# Patient Record
Sex: FEMALE | Race: WHITE
[De-identification: names, ages, dates, MRNs, and addresses within clinical notes are randomized per-mention and may not be internally consistent; named-entity substitution may affect disease eponyms.]

---

## 2017-06-07 ENCOUNTER — HOSPITAL ENCOUNTER (EMERGENCY)
Dept: HOSPITAL 63 - ER | Age: 47
Discharge: HOME | End: 2017-06-07
Payer: OTHER GOVERNMENT

## 2017-06-07 VITALS — HEIGHT: 66 IN | BODY MASS INDEX: 33.75 KG/M2 | WEIGHT: 210 LBS

## 2017-06-07 VITALS — DIASTOLIC BLOOD PRESSURE: 89 MMHG | SYSTOLIC BLOOD PRESSURE: 131 MMHG

## 2017-06-07 DIAGNOSIS — R10.31: Primary | ICD-10-CM

## 2017-06-07 DIAGNOSIS — R11.2: ICD-10-CM

## 2017-06-07 LAB
ALBUMIN SERPL-MCNC: 3.7 G/DL (ref 3.4–5)
ALBUMIN/GLOB SERPL: 0.9 {RATIO} (ref 1–1.7)
ALP SERPL-CCNC: 77 U/L (ref 46–116)
ALT SERPL-CCNC: 34 U/L (ref 14–59)
ANION GAP SERPL CALC-SCNC: 7 MMOL/L (ref 6–14)
APTT PPP: YELLOW S
AST SERPL-CCNC: 18 U/L (ref 15–37)
BACTERIA #/AREA URNS HPF: 0 /HPF
BASOPHILS # BLD AUTO: 0.1 X10^3/UL (ref 0–0.2)
BASOPHILS NFR BLD: 1 % (ref 0–3)
BILIRUB SERPL-MCNC: 0.3 MG/DL (ref 0.2–1)
BILIRUB UR QL STRIP: (no result)
BUN/CREAT SERPL: 15 (ref 6–20)
CA-I SERPL ISE-MCNC: 15 MG/DL (ref 7–20)
CALCIUM SERPL-MCNC: 8.8 MG/DL (ref 8.5–10.1)
CHLORIDE SERPL-SCNC: 105 MMOL/L (ref 98–107)
CO2 SERPL-SCNC: 29 MMOL/L (ref 21–32)
CREAT SERPL-MCNC: 1 MG/DL (ref 0.6–1)
EOSINOPHIL NFR BLD: 0.1 X10^3/UL (ref 0–0.7)
EOSINOPHIL NFR BLD: 1 % (ref 0–3)
ERYTHROCYTE [DISTWIDTH] IN BLOOD BY AUTOMATED COUNT: 13.5 % (ref 11.5–14.5)
FIBRINOGEN PPP-MCNC: CLEAR MG/DL
GFR SERPLBLD BASED ON 1.73 SQ M-ARVRAT: 60 ML/MIN
GLOBULIN SER-MCNC: 3.9 G/DL (ref 2.2–3.8)
GLUCOSE SERPL-MCNC: 101 MG/DL (ref 70–99)
GLUCOSE UR STRIP-MCNC: (no result) MG/DL
HCT VFR BLD CALC: 41.6 % (ref 36–47)
HGB BLD-MCNC: 14.5 G/DL (ref 12–15.5)
LIPASE: 86 U/L (ref 73–393)
LYMPHOCYTES # BLD: 2.4 X10^3/UL (ref 1–4.8)
LYMPHOCYTES NFR BLD AUTO: 29 % (ref 24–48)
MCH RBC QN AUTO: 31 PG (ref 25–35)
MCHC RBC AUTO-ENTMCNC: 35 G/DL (ref 31–37)
MCV RBC AUTO: 88 FL (ref 79–100)
MONO #: 0.7 X10^3/UL (ref 0–1.1)
MONOCYTES NFR BLD: 9 % (ref 0–9)
NEUT #: 4.8 X10^3UL (ref 1.8–7.7)
NEUTROPHILS NFR BLD AUTO: 60 % (ref 31–73)
NITRITE UR QL STRIP: (no result)
PLATELET # BLD AUTO: 248 X10^3/UL (ref 140–400)
POTASSIUM SERPL-SCNC: 4 MMOL/L (ref 3.5–5.1)
PROT SERPL-MCNC: 7.6 G/DL (ref 6.4–8.2)
RBC # BLD AUTO: 4.74 X10^6/UL (ref 3.5–5.4)
RBC #/AREA URNS HPF: (no result) /HPF (ref 0–2)
SODIUM SERPL-SCNC: 141 MMOL/L (ref 136–145)
SP GR UR STRIP: 1.01
SQUAMOUS #/AREA URNS LPF: (no result) /LPF
UROBILINOGEN UR-MCNC: 0.2 MG/DL
WBC # BLD AUTO: 8 X10^3/UL (ref 4–11)
WBC #/AREA URNS HPF: (no result) /HPF (ref 0–4)

## 2017-06-07 PROCEDURE — 80053 COMPREHEN METABOLIC PANEL: CPT

## 2017-06-07 PROCEDURE — 36415 COLL VENOUS BLD VENIPUNCTURE: CPT

## 2017-06-07 PROCEDURE — 81001 URINALYSIS AUTO W/SCOPE: CPT

## 2017-06-07 PROCEDURE — 85027 COMPLETE CBC AUTOMATED: CPT

## 2017-06-07 PROCEDURE — 96374 THER/PROPH/DIAG INJ IV PUSH: CPT

## 2017-06-07 PROCEDURE — 74177 CT ABD & PELVIS W/CONTRAST: CPT

## 2017-06-07 PROCEDURE — 96361 HYDRATE IV INFUSION ADD-ON: CPT

## 2017-06-07 PROCEDURE — 81025 URINE PREGNANCY TEST: CPT

## 2017-06-07 PROCEDURE — 99285 EMERGENCY DEPT VISIT HI MDM: CPT

## 2017-06-07 PROCEDURE — 96375 TX/PRO/DX INJ NEW DRUG ADDON: CPT

## 2017-06-07 PROCEDURE — 83690 ASSAY OF LIPASE: CPT

## 2017-06-07 NOTE — RAD
Exam performed: CT scan of the abdomen and pelvis with contrast

 

Clinical Indication: Right lower quadrant pain radiating back and 

inferiorly

 

Date of Service: 6/7/2017 no priors

 

Technique: Contiguous helical acquisitions are obtained  from the lung 

bases to the pelvis during intravenous administration of [75 cc of 

Omnipaque 300]. Sagittal and coronal reformatted images were obtained and 

reviewed. In addition Contiguous helical acquisitions of  the  lumbar 

spine are  reconstructed through the CT Abdomen and pelvis. Sagittal and 

coronal reformatted images  are obtained and reviewed.

 

CT abdomen findings:

 

The lung bases appear essentially clear. Visualized heart is normal.

 

The liver, spleen ,gall bladder and pancreas appears unremarkable. Both 

adrenal glands and bilateral kidneys appear normal with symmetric 

excretion of contrast via both kidneys. There is a 10.4 mm nonobstructing 

calculus in the right inferior renal pole. Aorta is normal in caliber 

without aneurysm.  The small bowel loops appear nondilated and 

unremarkable.  There is no retroperitoneal lymphadenopathy or mass 

lesions.  No bowel related inflammatory stranding is noted. Visualized 

appendix is normal. No obvious stranding is seen in the pericecal region. 

 

CT pelvis findings:

 

The pelvic bowel loops are nondilated and unremarkable.  The urinary 

bladder is well distended and normal .  Uterus is anteverted. No adnexal 

masses seen.

 

Interrogation of bone windows demonstrates no obvious bony abnormality. 

 

Sagittal and coronal  reformatted images were obtained and reviewed which 

demonstrate no additional findings. 

 

Impression abdomen and pelvis :

 

1. A 10.4 mm nonobstructing calculus in the right inferior renal pole.

2. No acute intra-abdominal or pelvic process is detected.  

 

End impression

 

CT lumbar spine findings:

 

Normal sagittal alignment is preserved. Five nonrib-bearing vertebral 

bodies are identified. The vertebral body heights and intervertebral disc 

spaces are maintained. There is no acute compression fracture. No 

prevertebral soft tissue swelling or mass is detected. The aorta is 

normal.

 

Impression:

1. No acute abnormality seen in the CT lumbar spine.

 

 

PQRS Compliance Statement:

 

One or more of the following individualized dose reduction techniques were

utilized for this examination:

1. Automated exposure control

2. Adjustment of the mA and/or kV according to patient size

3. Use of iterative reconstruction technique

 

Electronically signed by: Gudelia Patel MD (6/7/2017 6:12 PM)

## 2017-06-07 NOTE — PHYS DOC
Past History


Past Medical History:  No Pertinent History


Alcohol Use:  None


Drug Use:  None





Adult General


Chief Complaint


Chief Complaint:  BACK PAIN OR INJURY





HPI


HPI





This 46-year-old lady presents with pain in the right lower quadrant of her 

abdomen as well as pain in her back and the pain goes down the anterior aspect 

of her right leg.


The symptoms all been going on since chest facies had a little nausea vomiting 

has had no fever chills no diarrhea


She denies dysuria frequency urgency.


She is due to have a hysterectomy and a couple months.


She denies vaginal discharge or vaginal bleeding


She does note that she's had some swelling in her ankles yesterday





Review of Systems


Review of Systems





Constitutional: Denies fever or chills []


Eyes: Denies change in visual acuity, redness, or eye pain []


HENT: Denies nasal congestion or sore throat []


Respiratory: Denies cough or shortness of breath []


Cardiovascular: No additional information not addressed in HPI []


GI: Had right lower quadrant abdominal pain with, vomiting, ]


: Denies dysuria or hematuria []


Musculoskeletal: He does have some pain in her low back


Integument: Denies rash or skin lesions []


Neurologic: Denies headache, focal weakness or sensory changes []


Endocrine: Denies polyuria or polydipsia []





Current Medications


Current Medications





Current Medications








 Medications


  (Trade)  Dose


 Ordered  Sig/Annie  Start Time


 Stop Time Status Last Admin


Dose Admin


 


 Morphine Sulfate


  (Morphine 4mg


 Syringe)  4 mg  PRN Q15MIN  PRN  17 16:45


 17 16:44 UNV  


 


 


 Ondansetron HCl


  (Zofran)  4 mg  1X  ONCE  17 16:45


 17 16:46 UNV  


 


 


 Sodium Chloride  1,000 ml @ 


 1,000 mls/hr  Q1H  17 16:33


 17 17:32 UNV  


 











Physical Exam


Physical Exam





Constitutional: Well developed, well nourished, no acute distress, non-toxic 

appearance. []


HENT: Normocephalic, atraumatic, bilateral external ears normal, oropharynx 

moist, no oral exudates, nose normal. []


Eyes: PERRLA, EOMI, conjunctiva normal, no discharge. [] 


Neck: Normal range of motion, no tenderness, supple, no stridor. [] 


Cardiovascular:Heart rate regular rhythm, no murmur []


Lungs & Thorax:  Bilateral breath sounds clear to auscultation []


Abdomen: Bowel sounds normal, soft, patient does have tenderness in her right 

lower quadrant without rebound or guarding 


Skin: Warm, dry, no erythema, no rash. [] 


Back: No tenderness, no CVA tenderness. Straight leg raising is negative. DTRs 

are 2+ bilaterally motor and sensory examination are unremarkable


Extremities: No tenderness, no cyanosis, no clubbing, ROM intact, no edema. [] 


Neurologic: Alert and oriented X 3, normal motor function, normal sensory 

function, no focal deficits noted. []


Psychologic: Affect normal, judgement normal, mood normal. []





Current Patient Data


Vital Signs





 Vital Signs








  Date Time  Temp Pulse Resp B/P (MAP) Pulse Ox O2 Delivery O2 Flow Rate FiO2


 


17 15:58  89 16  99 Room Air  








Lab Results


Nursery Laboratory Tests


17 16:54: 


White Blood Count 8.0, Red Blood Count 4.74, Hemoglobin 14.5, Hematocrit 41.6, 

Mean Corpuscular Volume 88, Mean Corpuscular Hemoglobin 31, Mean Corpuscular 

Hemoglobin Concent 35, Red Cell Distribution Width 13.5, Platelet Count 248, 

Neutrophils (%) (Auto) 60, Lymphocytes (%) (Auto) 29, Monocytes (%) (Auto) 9, 

Eosinophils (%) (Auto) 1, Basophils (%) (Auto) 1, Neutrophils # (Auto) 4.8, 

Lymphocytes # (Auto) 2.4, Monocytes # (Auto) 0.7, Eosinophils # (Auto) 0.1, 

Basophils # (Auto) 0.1, Sodium Level 141, Potassium Level 4.0, Chloride Level 

105, Carbon Dioxide Level 29, Anion Gap 7, Blood Urea Nitrogen 15, Creatinine 

1.0, Estimated GFR (Cockcroft-Gault) 60.0, BUN/Creatinine Ratio 15, Glucose 

Level 101, Calcium Level 8.8, Total Bilirubin 0.3, Aspartate Amino Transf (AST/

SGOT) 18, Alanine Aminotransferase (ALT/SGPT) 34, Alkaline Phosphatase 77, 

Total Protein 7.6, Albumin 3.7, Albumin/Globulin Ratio 0.9, Lipase 86





EKG


EKG


[]





Radiology/Procedures


Radiology/Procedures


[] [address] [place] [id number] [phone]


 


 IMAGING REPORT





 Signed





PATIENT: LAURA RODRIGUEZ ACCOUNT: [id number] MRN#: [id number]


: 1970 LOCATION: ER AGE: 46


SEX: F EXAM DT: 17 ACCESSION#: [id number]


STATUS: REG ER ORD. PHYSICIAN: FARZAD NOLAN MD 


REASON: RLQ pain


PROCEDURE: CT ABD PELV W/ IV CONTRST ONLY





Exam performed: CT scan of the abdomen and pelvis with contrast


 


Clinical Indication: Right lower quadrant pain radiating back and 


inferiorly


 


Date of Service: 2017 no priors


 


Technique: Contiguous helical acquisitions are obtained  from the lung 


bases to the pelvis during intravenous administration of [75 cc of 


Omnipaque 300]. Sagittal and coronal reformatted images were obtained and 


reviewed. In addition Contiguous helical acquisitions of  the  lumbar 


spine are  reconstructed through the CT Abdomen and pelvis. Sagittal and 


coronal reformatted images  are obtained and reviewed.


 


CT abdomen findings:


 


The lung bases appear essentially clear. Visualized heart is normal.


 


The liver, spleen ,gall bladder and pancreas appears unremarkable. Both 


adrenal glands and bilateral kidneys appear normal with symmetric 


excretion of contrast via both kidneys. There is a 10.4 mm nonobstructing 


calculus in the right inferior renal pole. Aorta is normal in caliber 


without aneurysm.  The small bowel loops appear nondilated and 


unremarkable.  There is no retroperitoneal lymphadenopathy or mass 


lesions.  No bowel related inflammatory stranding is noted. Visualized 


appendix is normal. No obvious stranding is seen in the pericecal region. 


 


CT pelvis findings:


 


The pelvic bowel loops are nondilated and unremarkable.  The urinary 


bladder is well distended and normal .  Uterus is anteverted. No adnexal 


masses seen.


 


Interrogation of bone windows demonstrates no obvious bony abnormality. 


 


Sagittal and coronal  reformatted images were obtained and reviewed which 


demonstrate no additional findings. 


 


Impression abdomen and pelvis :


 


1. A 10.4 mm nonobstructing calculus in the right inferior renal pole.


2. No acute intra-abdominal or pelvic process is detected.  


 


End impression


 


CT lumbar spine findings:


 


Normal sagittal alignment is preserved. Five nonrib-bearing vertebral 


bodies are identified. The vertebral body heights and intervertebral disc 


spaces are maintained. There is no acute compression fracture. No 


prevertebral soft tissue swelling or mass is detected. The aorta is 


normal.


 


Impression:


1. No acute abnormality seen in the CT lumbar spine.


 


 


PQRS Compliance Statement:


 


One or more of the following individualized dose reduction techniques were


utilized for this examination:


1. Automated exposure control


2. Adjustment of the mA and/or kV according to patient size


3. Use of iterative reconstruction technique


 


Electronically signed by: Gudelia Patel MD (2017 6:12 PM)














DICTATED AND SIGNED BY:     GUDELIA PATEL MD


DATE:     17 1644





CC: FARZAD NOLAN MD; BERNIE OLSON MD ~





Course & Med Decision Making


Course & Med Decision Making


Pertinent Labs and Imaging studies reviewed. (See chart for details)





Patient turned over to me pending CT abdomen pelvis and lumbar spine looking 

for signs of appendicitis. Patient reevaluated patient is improved with IV 

medications provided by Dr. Waite. Reviewed laboratory work nurse's notes and 

vital signs which are unremarkable this time. Still pending a urinalysis. Urine 

pregnancy test is still pending. Times 6:51 PM


[] Urinalysis negative





Urine pregnancy test is negative. Impression abdominal pain of unclear etiology 

patient given supportive medications and asked to follow-up with her primary 

care doctor next 12-24 hours.





Dragon Disclaimer


Dragon Disclaimer


This chart was dictated in whole or in part using Voice Recognition software in 

a busy, high-work load, and often noisy Emergency Department environment.  It 

may contain unintended and wholly unrecognized errors or omissions.





Departure


Departure:


Impression:  


 Primary Impression:  


 Abdominal pain


Disposition:  01 HOME, SELF-CARE


Condition:  IMPROVED


Referrals:  


BERNIE OLSON MD (PCP)


Patient Instructions:  Abdominal Pain





Additional Instructions:  


Please return for any new or increasing symptoms, activity question concerns, 

or have any blood in urine fever greater than 102.2 or if you have any new back 

pain difficult urinating or questions or concerns.


Scripts


Ondansetron (ZOFRAN ODT) 8 Mg Tab.rapdis


4 MG PO TID for 7 Days


   Prov: APOLONIA CISSE MD         17 


Hydrocodone Bit/Acetaminophen (HYDROCODONE-APAP 5-325  **) 1 Each Tablet


1 TAB PO PRN Q6HRS Y for PAIN for 7 Days, #10 TAB 0 Refills


   Prov: APOLONIA CISSE MD         17











FARZAD NOLAN MD 2017 16:42


APOLONIA CISSE MD 2017 18:53

## 2018-08-18 ENCOUNTER — HOSPITAL ENCOUNTER (EMERGENCY)
Dept: HOSPITAL 63 - ER | Age: 48
Discharge: HOME | End: 2018-08-18
Payer: OTHER GOVERNMENT

## 2018-08-18 VITALS — HEIGHT: 66 IN | WEIGHT: 210 LBS | BODY MASS INDEX: 33.75 KG/M2

## 2018-08-18 VITALS — SYSTOLIC BLOOD PRESSURE: 132 MMHG | DIASTOLIC BLOOD PRESSURE: 82 MMHG

## 2018-08-18 DIAGNOSIS — J45.909: Primary | ICD-10-CM

## 2018-08-18 LAB
ANION GAP SERPL CALC-SCNC: 11 MMOL/L (ref 6–14)
APTT PPP: YELLOW S
BACTERIA #/AREA URNS HPF: (no result) /HPF
BASOPHILS # BLD AUTO: 0.1 X10^3/UL (ref 0–0.2)
BASOPHILS NFR BLD: 2 % (ref 0–3)
BILIRUB UR QL STRIP: (no result)
CA-I SERPL ISE-MCNC: 13 MG/DL (ref 7–20)
CALCIUM SERPL-MCNC: 8.6 MG/DL (ref 8.5–10.1)
CHLORIDE SERPL-SCNC: 107 MMOL/L (ref 98–107)
CO2 SERPL-SCNC: 25 MMOL/L (ref 21–32)
CREAT SERPL-MCNC: 0.9 MG/DL (ref 0.6–1)
EOSINOPHIL NFR BLD: 0.4 X10^3/UL (ref 0–0.7)
EOSINOPHIL NFR BLD: 4 % (ref 0–3)
ERYTHROCYTE [DISTWIDTH] IN BLOOD BY AUTOMATED COUNT: 12.8 % (ref 11.5–14.5)
FIBRINOGEN PPP-MCNC: (no result) MG/DL
GFR SERPLBLD BASED ON 1.73 SQ M-ARVRAT: 67.4 ML/MIN
GLUCOSE SERPL-MCNC: 109 MG/DL (ref 70–99)
GLUCOSE UR STRIP-MCNC: (no result) MG/DL
HCT VFR BLD CALC: 40.9 % (ref 36–47)
HGB BLD-MCNC: 14.4 G/DL (ref 12–15.5)
LYMPHOCYTES # BLD: 2.2 X10^3/UL (ref 1–4.8)
LYMPHOCYTES NFR BLD AUTO: 23 % (ref 24–48)
MCH RBC QN AUTO: 31 PG (ref 25–35)
MCHC RBC AUTO-ENTMCNC: 35 G/DL (ref 31–37)
MCV RBC AUTO: 87 FL (ref 79–100)
MONO #: 0.6 X10^3/UL (ref 0–1.1)
MONOCYTES NFR BLD: 6 % (ref 0–9)
NEUT #: 6 X10^3UL (ref 1.8–7.7)
NEUTROPHILS NFR BLD AUTO: 64 % (ref 31–73)
NITRITE UR QL STRIP: (no result)
PLATELET # BLD AUTO: 256 X10^3/UL (ref 140–400)
POTASSIUM SERPL-SCNC: 3.9 MMOL/L (ref 3.5–5.1)
RBC # BLD AUTO: 4.69 X10^6/UL (ref 3.5–5.4)
RBC #/AREA URNS HPF: (no result) /HPF (ref 0–2)
SODIUM SERPL-SCNC: 143 MMOL/L (ref 136–145)
SP GR UR STRIP: 1.02
SQUAMOUS #/AREA URNS LPF: (no result) /LPF
UROBILINOGEN UR-MCNC: 0.2 MG/DL
WBC # BLD AUTO: 9.3 X10^3/UL (ref 4–11)
WBC #/AREA URNS HPF: (no result) /HPF (ref 0–4)

## 2018-08-18 PROCEDURE — 81001 URINALYSIS AUTO W/SCOPE: CPT

## 2018-08-18 PROCEDURE — 99285 EMERGENCY DEPT VISIT HI MDM: CPT

## 2018-08-18 PROCEDURE — 36415 COLL VENOUS BLD VENIPUNCTURE: CPT

## 2018-08-18 PROCEDURE — 71046 X-RAY EXAM CHEST 2 VIEWS: CPT

## 2018-08-18 PROCEDURE — 94640 AIRWAY INHALATION TREATMENT: CPT

## 2018-08-18 PROCEDURE — 84484 ASSAY OF TROPONIN QUANT: CPT

## 2018-08-18 PROCEDURE — 87880 STREP A ASSAY W/OPTIC: CPT

## 2018-08-18 PROCEDURE — 93005 ELECTROCARDIOGRAM TRACING: CPT

## 2018-08-18 PROCEDURE — 96374 THER/PROPH/DIAG INJ IV PUSH: CPT

## 2018-08-18 PROCEDURE — 87070 CULTURE OTHR SPECIMN AEROBIC: CPT

## 2018-08-18 PROCEDURE — 80048 BASIC METABOLIC PNL TOTAL CA: CPT

## 2018-08-18 PROCEDURE — 85025 COMPLETE CBC W/AUTO DIFF WBC: CPT

## 2018-08-18 PROCEDURE — 96375 TX/PRO/DX INJ NEW DRUG ADDON: CPT

## 2018-08-18 PROCEDURE — 96361 HYDRATE IV INFUSION ADD-ON: CPT

## 2018-08-18 NOTE — EKG
[facility] [address], [place] [address]

Test Date:    2018               Test Time:    07:44:32

Pat Name:     LAURA RODRIGUEZ          Department:   

Patient ID:   [id number]           Room:          

Gender:       F                        Technician:   

:          1970               Requested By: BJ IYER

Order Number: [id number]            Reading MD:   Scott Roger MD

                                 Measurements

Intervals                              Axis          

Rate:         98                       P:            40

WV:           136                      QRS:          16

QRSD:         70                       T:            1

QT:           350                                    

QTc:          449                                    

                           Interpretive Statements

SINUS RHYTHM



Electronically Signed On 2018 10:02:38 CDT by Scott Roger MD

## 2018-08-18 NOTE — PHYS DOC
Past History


Past Medical History:  Asthma


Past Surgical History:  Hysterectomy


Alcohol Use:  Occasionally


Drug Use:  None





Adult General


Chief Complaint


Chief Complaint:  COUGH





HPI


HPI





Patient is a 47 year old female who presents with complaining of sore throat, 

cough and chest pain. Patient states that she developed a sore throat with a 

fever of 102 approximately several days ago. Patient also progressively 

developed a nonproductive cough and states that she has upper chest pain with 

coughing. Cough is nonproductive. Patient also states that she had asthma as a 

child but is not a smoker.





Patient just finished driving her daughter to and from school when she is in 

[place]. Patient comes in today complaining of an inability to sleep last 

night secondary to cough, sore throat, general malaise and low-grade fever. 

Patient denies any calf or leg pain or swelling.





Review of Systems


Review of Systems





Constitutional: Denies fever or chills []


Eyes: Denies change in visual acuity, redness, or eye pain []


HENT: Denies nasal congestion or sore throat []


Respiratory: Positive for cough negative for shortness of breath []


Cardiovascular: No additional information not addressed in HPI chest pain 

associated with coughing only


GI: Denies abdominal pain, nausea, vomiting, bloody stools or diarrhea []


: Denies dysuria or hematuria []


Musculoskeletal: Denies back pain or joint pain []


Integument: Denies rash or skin lesions []


Neurologic: Denies headache, focal weakness or sensory changes []


Endocrine: Denies polyuria or polydipsia []





All other systems were reviewed and found to be within normal limits, except as 

documented in this note.





Allergies


Allergies





Allergies








Coded Allergies Type Severity Reaction Last Updated Verified


 


  No Known Drug Allergies    6/7/17 No











Physical Exam


Physical Exam





Constitutional: Well developed, well nourished, no acute distress, non-toxic 

appearance. []


HENT: Normocephalic, atraumatic, bilateral external ears normal, oropharynx 

moist, no oral exudates, nose normal. Mild erythema to the hypopharynx without 

exudate or ulceration


Eyes: PERRLA, EOMI, conjunctiva normal, no discharge. [] 


Neck: Normal range of motion, no tenderness, supple, no stridor. [] 


Cardiovascular:Heart rate regular rhythm, no murmur []


Lungs & Thorax:  Bilateral breath sounds clear to auscultation []


Abdomen: Bowel sounds normal, soft, no tenderness, no masses, no pulsatile 

masses. [] 


Skin: Warm, dry, no erythema, no rash. [] 


Back: No tenderness, no CVA tenderness. [] 


Extremities: No tenderness, no cyanosis, no clubbing, ROM intact, no edema. [] 


Neurologic: Alert and oriented X 3, normal motor function, normal sensory 

function, no focal deficits noted. []


Psychologic: Affect normal, judgement normal, mood normal. []





Current Patient Data


Vital Signs





 Vital Signs








  Date Time  Temp Pulse Resp B/P (MAP) Pulse Ox O2 Delivery O2 Flow Rate FiO2


 


8/18/18 07:37      Room Air  


 


8/18/18 07:37 97.7 88 20  95   











EKG


EKG


NSR @98[]





Radiology/Procedures


Radiology/Procedures


[]





Course & Med Decision Making


Course & Med Decision Making


Pertinent Labs and Imaging studies reviewed. (See chart for details)





Patient reassessed and states that she feels, after the breathing treatment, 

that she can feel like she can breathe easier and deeper. Patient is coughing 

intermittently during her stay here, nonproductive but looks like she is in 

less distress. Given this improvement with a single treatment I will add Solu-

Medrol and give her another treatment patient is being hydrated.





Dragon Disclaimer


Dragon Disclaimer


This electronic medical record was generated, in whole or in part, using a 

voice recognition dictation system.





Departure


Departure:


Impression:  


 Primary Impression:  


 Asthmatic bronchitis


Disposition:  01 HOME, SELF-CARE


Condition:  STABLE


Referrals:  


BERNIE OLSON MD (PCP)


Patient Instructions:  Acute Bronchitis, Easy-to-Read, Asthma, Acute 

Bronchospasm


Scripts


Prednisone (PREDNISONE) 50 Mg Tablet


1 TAB PO DAILY, #3 TAB


   Prov: BJ IYER MD         8/18/18 


Albuterol Sulfate (PROAIR HFA INHALER) 8.5 Gm Hfa.aer.ad


2 PUFF INH PRN Q6HRS PRN for SHORTNESS OF BREATH, #1 INHALER 1 Refill


   Prov: BJ IYER MD         8/18/18











BJ IYER MD Aug 18, 2018 08:03

## 2018-08-18 NOTE — RAD
PROCEDURE: CHEST PA   LATERAL

 

CLINICAL INDICATION: Cough x 3 days, pt shielded

 

COMPARISON: None

 

FINDINGS:  

No pneumothorax identified. Cardiac and mediastinal contours unremarkable.

No pulmonary consolidation or acute airspace disease. No acute osseous 

abnormalities identified. 

 

IMPRESSION:

No pulmonary consolidation or acute airspace disease. 

 

 

 

Electronically signed by: Devyn Zaidi DO (8/18/2018 8:32 AM) [facility]

## 2021-05-01 ENCOUNTER — HOSPITAL ENCOUNTER (EMERGENCY)
Dept: HOSPITAL 63 - ER | Age: 51
Discharge: HOME | End: 2021-05-01
Payer: OTHER GOVERNMENT

## 2021-05-01 VITALS — HEIGHT: 66 IN | BODY MASS INDEX: 27.64 KG/M2 | WEIGHT: 171.96 LBS

## 2021-05-01 VITALS — SYSTOLIC BLOOD PRESSURE: 147 MMHG | DIASTOLIC BLOOD PRESSURE: 88 MMHG

## 2021-05-01 DIAGNOSIS — Z90.710: ICD-10-CM

## 2021-05-01 DIAGNOSIS — N83.202: ICD-10-CM

## 2021-05-01 DIAGNOSIS — J45.909: ICD-10-CM

## 2021-05-01 DIAGNOSIS — N20.0: ICD-10-CM

## 2021-05-01 DIAGNOSIS — K57.90: Primary | ICD-10-CM

## 2021-05-01 LAB
APTT PPP: COLORLESS S
BACTERIA #/AREA URNS HPF: 0 /HPF
BILIRUB UR QL STRIP: (no result)
FIBRINOGEN PPP-MCNC: CLEAR MG/DL
GLUCOSE UR STRIP-MCNC: (no result) MG/DL
NITRITE UR QL STRIP: (no result)
RBC #/AREA URNS HPF: (no result) /HPF (ref 0–2)
SP GR UR STRIP: >=1.03
SQUAMOUS #/AREA URNS LPF: (no result) /LPF
UROBILINOGEN UR-MCNC: 0.2 MG/DL
WBC #/AREA URNS HPF: 0 /HPF (ref 0–4)

## 2021-05-01 PROCEDURE — 99284 EMERGENCY DEPT VISIT MOD MDM: CPT

## 2021-05-01 PROCEDURE — 74176 CT ABD & PELVIS W/O CONTRAST: CPT

## 2021-05-01 PROCEDURE — 81001 URINALYSIS AUTO W/SCOPE: CPT

## 2021-05-01 NOTE — PHYS DOC
Past History


Past Medical History:  Asthma


Past Surgical History:  Hysterectomy


Alcohol Use:  Occasionally


Drug Use:  None





Adult General


Chief Complaint


Chief Complaint:  MULTIPLE COMPLAINTS





[facility]





Patient is a female with a history of partial hysterectomy presenting today 

complaining of 3 out of 10 sharp left flank pain intermittently since this 

afternoon.  Patient denies anything specifically exacerbating the pain but 

states Tylenol r relieved some of the pain, she states the pain radiates to the 

left lower quadrant.  Denies any nausea, vomiting, denies any history of kidney 

stones.  Denies any hematuria.  Reports slight dysuria.





Review of Systems


Review of Systems





Constitutional: Denies fever or chills []


Eyes: Denies change in visual acuity, redness, or eye pain []


HENT: Denies nasal congestion or sore throat []


Respiratory: Denies cough or shortness of breath []


Cardiovascular: No additional information not addressed in hospitals []


GI: Reports left flank pain radiating to the left lower quadrant abdominal pain,

 denies nausea, vomiting, bloody stools or diarrhea []


: Denies dysuria or hematuria []


Musculoskeletal: Denies back pain or joint pain []


Integument: Denies rash or skin lesions []


Neurologic: Denies headache, focal weakness or sensory changes []








All other systems were reviewed and found to be within normal limits, except as 

documented in this note.





Allergies


Allergies





Allergies








Coded Allergies Type Severity Reaction Last Updated Verified


 


  No Known Drug Allergies    6/7/17 No











Physical Exam


Physical Exam





Constitutional: Well developed, well nourished, no acute distress, non-toxic 

appearance. []


HENT: Normocephalic, atraumatic, bilateral external ears normal, oropharynx 

moist, no oral exudates, nose normal. []


Eyes: PERRLA, EOMI, conjunctiva normal, no discharge. [] 


Neck: Normal range of motion, no tenderness, supple, no stridor. [] 


Cardiovascular:Heart rate regular rhythm, no murmur []


Lungs & Thorax:  Bilateral breath sounds clear to auscultation []


Abdomen: Bowel sounds normal, soft, no tenderness, no masses, no pulsatile 

masses. [] 


Skin: Warm, dry, no erythema, no rash. [] 


Back: No tenderness, no CVA tenderness. [] 


Extremities: No tenderness, no cyanosis, no clubbing, ROM intact, no edema. [] 


Neurologic: Alert and oriented X 3, normal motor function, normal sensory 

function, no focal deficits noted. []


Psychologic: Affect normal, judgement normal, mood normal. []





Current Patient Data


Vital Signs





                                   Vital Signs








  Date Time  Temp Pulse Resp B/P (MAP) Pulse Ox O2 Delivery O2 Flow Rate FiO2


 


5/1/21 17:10 97.7 86 20 147/88 (107) 97 Room Air  








Lab Results





                                Laboratory Tests








Test


 5/1/21


17:15


 


Urine Collection Type Unknown  


 


Urine Color Colorless  


 


Urine Clarity Clear  


 


Urine pH 6.5  


 


Urine Specific Gravity >=1.030  


 


Urine Protein


 Neg


(NEG-TRACE)


 


Urine Glucose (UA)


 Neg mg/dL


(NEG)


 


Urine Ketones (Stick)


 Neg mg/dL


(NEG)


 


Urine Blood Small (NEG)  


 


Urine Nitrite Neg (NEG)  


 


Urine Bilirubin Neg (NEG)  


 


Urine Urobilinogen Dipstick


 0.2 mg/dL (0.2


mg/dL)


 


Urine Leukocyte Esterase Neg (NEG)  


 


Urine RBC


 3-5 /HPF (0-2)





 


Urine WBC 0 /HPF (0-4)  


 


Urine Squamous Epithelial


Cells Occ /LPF  





 


Urine Bacteria


 0 /HPF (0-FEW)














EKG


EKG


[]





Radiology/Procedures


Radiology/Procedures


[]PROCEDURE: CT ABDOMEN PELVIS WO CONTRAST





Exam: CT of abdomen and pelvis without contrast





INDICATION: Left flank pain





TECHNIQUE: Sequential axial images through the abdomen and pelvis obtained 

without IV contrast. Sagittal and coronal reformatted images were reconstructed 

from the axial data and reviewed.





Exposure: One or more of the following in the visualized dose reduction 

techniques were utilized for this examination:


1.  Automated exposure control


2.  Adjustment of the MA and/or KV according to patient size


3.  Use of iterative of reconstructive technique





Comparisons: None





FINDINGS:


Heart size is normal. No pericardial effusion. Visualized lung bases are clear. 

No pleural effusion.





Diffuse hepatic steatosis. Spleen, pancreas, gallbladder and adrenals are 

unremarkable.





No perinephric inflammation or hydronephrosis. Nonobstructing 5 mm calculus at 

the lower pole of the right kidney. No ureteral calculi are identified.





Bladder is partially distended and appears thin-walled. Uterus is absent. No 

flow cystic lesions noted at the left adnexa measuring up to 6.9 x 4.1 cm.





Few scattered diverticula noted in the sigmoid colon. Appendix is normal. No 

free intra-abdominal air or fluid. No obstruction.





Abdominal aorta has a normal course and caliber.





No enlarged intra-abdominal lymph nodes are identified.





No suspicious osseous lesions or acute fractures.





IMPRESSION:


1.  Cystic lesion at the left adnexa measuring 6.9 x 4.1 cm incompletely 

characterized on CT, favored represent large cyst within the left ovary. This 

would be better evaluated on ultrasound.


2.  Mild diverticulosis without evidence of acute diverticulitis.


3.  Nonobstructing right renal calculus. No ureteral calculi or evidence for 

obstructive uropathy.











Electronically signed by: Unique Luna MD (5/1/2021 6:34 PM) [facility]














DICTATED AND SIGNED BY:     UNIQUE LUNA MD


DATE:     05/01/21 1829





CC: CRISTAL ELLIOTT; LYDIA BLANC APRN ~MTH0 0





Heart Score


C/O Chest Pain:  N/A


Risk Factors:


Risk Factors:  DM, Current or recent (<one month) smoker, HTN, HLP, family 

history of CAD, obesity.


Risk Scores:


Risk Factors:  DM, Current or recent (<one month) smoker, HTN, HLP, family 

history of CAD, obesity.





Course & Med Decision Making


Course & Med Decision Making


Pertinent Labs and Imaging studies reviewed. (See chart for details)





[This is a 50-year-old female patient presented to the ED today with left flank 

pain radiating to the left lower quadrant that began today.  Urine negative for 

UTI, noted for small amounts of blood CT of the abdomen and pelvic was noted for

 a large left ovarian cyst, diverticulosis with no diverticulitis other none 

obstructing right renal calculus.





I spoke to patient, emphasized the importance of following up with an OB/GYN for

 further work-up on the left ovarian cyst.  Discharge to home.





Dragon Disclaimer


Dragon Disclaimer


This electronic medical record was generated, in whole or in part, using a voice

 recognition dictation system.





Departure


Departure:


Impression:  


   Primary Impression:  


   Diverticulosis


   Additional Impressions:  


   Left ovarian cyst


   Kidney stone on right side


Disposition:  01 HOME / SELF CARE / HOMELESS


Condition:  STABLE


Referrals:  


CRISTAL ELLIOTT (PCP)


Follow-up with your OB/GYN and primary care doctor soon as possible


Patient Instructions:  Diverticulosis, Kidney Stones, Easy-to-Read, Ovarian Cyst





Additional Instructions:  


You were seen in the emergency room, you were noted to have a left ovarian cyst.

  This needs to be followed up with your OB/GYN as soon as possible.  You also 

have nonobstructing right kidney stone, this stone is in your kidneys and typi

michael does not cause issues unless it starts moving.  You also have 

diverticulosis which is a outpouching on your colon, this is not an issue unless

 it gets infected.  You can take Tylenol or Motrin for pain.  Follow-up with 

primary care doctor and OB/GYN





Problem Qualifiers











LYDIA BLANC APRN             May 1, 2021 18:54

## 2021-05-01 NOTE — RAD
Exam: CT of abdomen and pelvis without contrast



INDICATION: Left flank pain



TECHNIQUE: Sequential axial images through the abdomen and pelvis obtained without IV contrast. Sagit
jose angel and coronal reformatted images were reconstructed from the axial data and reviewed.



Exposure: One or more of the following in the visualized dose reduction techniques were utilized for 
this examination:

1.  Automated exposure control

2.  Adjustment of the MA and/or KV according to patient size

3.  Use of iterative of reconstructive technique



Comparisons: None



FINDINGS:

Heart size is normal. No pericardial effusion. Visualized lung bases are clear. No pleural effusion.



Diffuse hepatic steatosis. Spleen, pancreas, gallbladder and adrenals are unremarkable.



No perinephric inflammation or hydronephrosis. Nonobstructing 5 mm calculus at the lower pole of the 
right kidney. No ureteral calculi are identified.



Bladder is partially distended and appears thin-walled. Uterus is absent. No flow cystic lesions note
d at the left adnexa measuring up to 6.9 x 4.1 cm.



Few scattered diverticula noted in the sigmoid colon. Appendix is normal. No free intra-abdominal air
 or fluid. No obstruction.



Abdominal aorta has a normal course and caliber.



No enlarged intra-abdominal lymph nodes are identified.



No suspicious osseous lesions or acute fractures.



IMPRESSION:

1.  Cystic lesion at the left adnexa measuring 6.9 x 4.1 cm incompletely characterized on CT, favored
 represent large cyst within the left ovary. This would be better evaluated on ultrasound.

2.  Mild diverticulosis without evidence of acute diverticulitis.

3.  Nonobstructing right renal calculus. No ureteral calculi or evidence for obstructive uropathy.







Electronically signed by: Unique Damon MD (5/1/2021 6:34 PM) [facility]